# Patient Record
Sex: FEMALE | Race: OTHER | NOT HISPANIC OR LATINO
[De-identification: names, ages, dates, MRNs, and addresses within clinical notes are randomized per-mention and may not be internally consistent; named-entity substitution may affect disease eponyms.]

---

## 2020-12-15 ENCOUNTER — APPOINTMENT (OUTPATIENT)
Dept: OBGYN | Facility: CLINIC | Age: 55
End: 2020-12-15

## 2021-04-22 ENCOUNTER — APPOINTMENT (OUTPATIENT)
Dept: DERMATOLOGY | Facility: CLINIC | Age: 56
End: 2021-04-22

## 2022-06-05 ENCOUNTER — EMERGENCY (EMERGENCY)
Facility: HOSPITAL | Age: 57
LOS: 1 days | Discharge: HOME | End: 2022-06-05
Attending: EMERGENCY MEDICINE | Admitting: EMERGENCY MEDICINE
Payer: SELF-PAY

## 2022-06-05 VITALS
OXYGEN SATURATION: 100 % | HEART RATE: 60 BPM | DIASTOLIC BLOOD PRESSURE: 77 MMHG | TEMPERATURE: 98 F | SYSTOLIC BLOOD PRESSURE: 118 MMHG | RESPIRATION RATE: 16 BRPM

## 2022-06-05 VITALS
OXYGEN SATURATION: 100 % | HEART RATE: 64 BPM | RESPIRATION RATE: 16 BRPM | DIASTOLIC BLOOD PRESSURE: 77 MMHG | SYSTOLIC BLOOD PRESSURE: 132 MMHG | TEMPERATURE: 97 F

## 2022-06-05 PROCEDURE — 99284 EMERGENCY DEPT VISIT MOD MDM: CPT | Mod: 25

## 2022-06-05 PROCEDURE — 71046 X-RAY EXAM CHEST 2 VIEWS: CPT | Mod: 26

## 2022-06-05 PROCEDURE — 93010 ELECTROCARDIOGRAM REPORT: CPT | Mod: NC

## 2022-06-05 RX ORDER — IBUPROFEN 200 MG
600 TABLET ORAL ONCE
Refills: 0 | Status: COMPLETED | OUTPATIENT
Start: 2022-06-05 | End: 2022-06-05

## 2022-06-05 RX ORDER — ACETAMINOPHEN 500 MG
650 TABLET ORAL ONCE
Refills: 0 | Status: COMPLETED | OUTPATIENT
Start: 2022-06-05 | End: 2022-06-05

## 2022-06-05 RX ORDER — IBUPROFEN 200 MG
1 TABLET ORAL
Qty: 12 | Refills: 0
Start: 2022-06-05 | End: 2022-06-08

## 2022-06-05 RX ADMIN — Medication 650 MILLIGRAM(S): at 16:17

## 2022-06-05 RX ADMIN — Medication 600 MILLIGRAM(S): at 16:18

## 2022-06-05 NOTE — ED PROVIDER NOTE - PATIENT PORTAL LINK FT
You can access the FollowMyHealth Patient Portal offered by [facility] by registering at the following website: [email or web address]. By joining [employer]’s FollowMyHealth portal, you will also be able to view your health information using other applications (apps) compatible with our system.

## 2022-06-05 NOTE — ED PROVIDER NOTE - NSFOLLOWUPINSTRUCTIONS_ED_ALL_ED_FT
Follow up with your primary care doctor within 1 week  Take Ibuprofen 600mg every 6-8 hours as needed for pain, take with food  Return to the ER with any worsening or concerning symptoms, increased pain, shortness of breath, weakness, feeling faint or any other concerns.
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
## 2022-06-05 NOTE — ED PROVIDER NOTE - PROGRESS NOTE DETAILS
JEANNE Adam: CXR normal, will d/c home with PMD follow up. Pt denies pain at present, resolved with ibuprofen. She will return to the ER with any worsening or concerning symptoms.

## 2022-06-05 NOTE — ED PROVIDER NOTE - NS ED ATTENDING STATEMENT MOD
I have seen and examined this patient and fully participated in the care of this patient as the teaching attending.  The service was shared with the SAIDA.  I reviewed and verified the documentation and independently performed the documented:
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
## 2022-06-13 PROBLEM — Z00.00 ENCOUNTER FOR PREVENTIVE HEALTH EXAMINATION: Status: ACTIVE | Noted: 2022-06-13

## 2024-05-21 ENCOUNTER — EMERGENCY (EMERGENCY)
Facility: HOSPITAL | Age: 59
LOS: 1 days | Discharge: HOME | End: 2024-05-21
Admitting: STUDENT IN AN ORGANIZED HEALTH CARE EDUCATION/TRAINING PROGRAM
Payer: COMMERCIAL

## 2024-05-21 VITALS
OXYGEN SATURATION: 100 % | SYSTOLIC BLOOD PRESSURE: 146 MMHG | HEART RATE: 59 BPM | DIASTOLIC BLOOD PRESSURE: 84 MMHG | TEMPERATURE: 98 F | RESPIRATION RATE: 18 BRPM

## 2024-05-21 VITALS
TEMPERATURE: 98 F | SYSTOLIC BLOOD PRESSURE: 150 MMHG | HEART RATE: 55 BPM | DIASTOLIC BLOOD PRESSURE: 85 MMHG | OXYGEN SATURATION: 100 % | RESPIRATION RATE: 16 BRPM | WEIGHT: 156.97 LBS

## 2024-05-21 PROCEDURE — 99283 EMERGENCY DEPT VISIT LOW MDM: CPT | Mod: 25

## 2024-05-21 PROCEDURE — 64450 NJX AA&/STRD OTHER PN/BRANCH: CPT | Mod: F8

## 2024-05-21 RX ORDER — LIDOCAINE HCL 20 MG/ML
10 VIAL (ML) INJECTION ONCE
Refills: 0 | Status: COMPLETED | OUTPATIENT
Start: 2024-05-21 | End: 2024-05-21

## 2024-05-21 RX ORDER — TETANUS TOXOID, REDUCED DIPHTHERIA TOXOID AND ACELLULAR PERTUSSIS VACCINE, ADSORBED 5; 2.5; 8; 8; 2.5 [IU]/.5ML; [IU]/.5ML; UG/.5ML; UG/.5ML; UG/.5ML
0.5 SUSPENSION INTRAMUSCULAR ONCE
Refills: 0 | Status: COMPLETED | OUTPATIENT
Start: 2024-05-21 | End: 2024-05-21

## 2024-05-21 RX ORDER — CEPHALEXIN 500 MG
500 CAPSULE ORAL ONCE
Refills: 0 | Status: COMPLETED | OUTPATIENT
Start: 2024-05-21 | End: 2024-05-21

## 2024-05-21 RX ORDER — CEPHALEXIN 500 MG
1 CAPSULE ORAL
Qty: 21 | Refills: 0
Start: 2024-05-21 | End: 2024-05-27

## 2024-05-21 RX ADMIN — Medication 10 MILLILITER(S): at 21:00

## 2024-05-21 RX ADMIN — TETANUS TOXOID, REDUCED DIPHTHERIA TOXOID AND ACELLULAR PERTUSSIS VACCINE, ADSORBED 0.5 MILLILITER(S): 5; 2.5; 8; 8; 2.5 SUSPENSION INTRAMUSCULAR at 21:14

## 2024-05-21 RX ADMIN — Medication 500 MILLIGRAM(S): at 21:14

## 2024-05-21 NOTE — ED ADULT NURSE NOTE - OBJECTIVE STATEMENT
Pt arrives to procedure room A&Ox3 and ambulatory at baseline. Denies any PH. Pt comes to ED c/o ring stuck on R ring finger. Pt states she went to remove the ring today when she was unable to. She states her and her daughter tried removing with lotion and soap and water but the finger only got more swollen. Pt endorsing mild pain to ring finger. R Ring finger noted to be swollen. Respirations even and unlabored on room air. No acute distress noted. Pt medicated per EMAR. Plan of care ongoing, comfort measures provided and safety measures maintained. Pt awaiting disposition.

## 2024-05-21 NOTE — ED ADULT TRIAGE NOTE - CHIEF COMPLAINT QUOTE
Came from urgent care, stating metal ring is stuck on right ring finger, unable to remove it. Right ring finger appears swollen, reddened, painful. Sensation intact. No significant medical history.

## 2024-05-21 NOTE — ED PROVIDER NOTE - CLINICAL SUMMARY MEDICAL DECISION MAKING FREE TEXT BOX
Pt is a 60 YO F with no significant PMH who presented to ED with ring stuck to right ring finger. Ring has been in place x 1 week. Plan for digital block and remov ring with ring cutter

## 2024-05-21 NOTE — ED PROVIDER NOTE - CARE PROVIDERS DIRECT ADDRESSES
[email or web address]

## 2024-05-21 NOTE — ED PROVIDER NOTE - OBJECTIVE STATEMENT
Is a 59-year-old female with no significant past medical history who presented to ED with stuck on finger.  Patient reports she has had ongoing right ring finger for approximately 1 week.  Patient reports today she tried to remove the ring and was unable to.  Patient reports she was seen at urgent care prior to arrival and they were unable to remove the ring.  Patient does not know what material the ring is made out of.  Patient denies numbness or loss of sensation to the finger.

## 2024-05-21 NOTE — ED PROVIDER NOTE - CARE PROVIDER_API CALL
Jose F Elliott whitney  Orthopaedic Surgery  [address] [place] [address], [facility] [address]  [place] [address]  Phone: [phone]  Fax: [phone]  Follow Up Time: 4-6 Days

## 2024-05-21 NOTE — ED PROVIDER NOTE - PATIENT PORTAL LINK FT
You can access the FollowMyHealth Patient Portal offered by [facility] by registering at the following website: [email or web address]. By joining [employer]’s FollowMyHealth portal, you will also be able to view your health information using other applications (apps) compatible with our system.

## 2024-05-21 NOTE — ED PROVIDER NOTE - PROGRESS NOTE DETAILS
JEANNE Martinez: ring successfully removed after digital block with ring cutter   + swelling to finger but neurovascular intact  will give tetanus shot and d/c with keflex, hand surgery follow up

## 2024-05-21 NOTE — ED PROVIDER NOTE - NSFOLLOWUPINSTRUCTIONS_ED_ALL_ED_FT
Take Keflex one tablet by mouth 3 times daily with food  Follow up with Hand surgery  Return to ER if any worsening pain, redness, discharge, swelling, color changes, loss of sensation or any other concerns

## 2024-05-22 PROBLEM — Z78.9 OTHER SPECIFIED HEALTH STATUS: Chronic | Status: ACTIVE | Noted: 2022-06-05

## 2024-12-17 NOTE — ED PROVIDER NOTE - NSICDXPASTMEDICALHX_GEN_ALL_CORE_FT
[facility] Consultation / Initial Visit    Today's Date: 12/17/2024  PCP: Pcp, No  Referring Provider: Sana Vizcaino MD    INDICATION FOR CONSULTATION: NSTEMI  HPI:     Kaela Harris is a 89 year old very pleasant with the current active medical conditions history of coronary artery disease (CAD), hypertension (HTN), gastroesophageal reflux disease (GERD), and hyperlipidemia (HLD). The patient presents to the hospital for evaluation following a fall.    She arrived to the hospital by her daughter calling the EMS when she found her mother in the bathroom, fallen over. She does not remember falling down or how long she was on the ground.     Denies any chest pain, SOB      ROS:     Gen: No fevers or chills,  weight loss,  or weight gain   HEENT: No visual changes, hearing loss, epistaxis, or  sore throat  CV: No palpitations,  CP,  dizziness, or  syncope  Pulm: No SOB, cough, or hemoptysis  GI: No BRBPR , hematemesis,  nausea,  or emesis  : No dysuria or hematuria  Musculoskeletal: No  myalga  Neuro: No seizures, headaches, or weakness  Skin: No rashes or  easy bruising  Psychiatric: No depression or anxiety    Relevant Past Medical History:  Past Medical History:   Diagnosis Date    Coronary artery disease     Essential (primary) hypertension     GERD (gastroesophageal reflux disease)     Hyperlipidemia       Past Surgical History:   Procedure Laterality Date    Cath place for coronary angiography w md sup - interp graft & rt heart      stent        Social History:  Social History     Tobacco Use   Smoking Status Never   Smokeless Tobacco Never     Social History     Substance and Sexual Activity   Alcohol Use Never     History   Drug Use Unknown       Family History:   Family History   Problem Relation Age of Onset    Hypertension Mother     Hypertension Father     Hypertension Sister        Inpatient Medications  Current Facility-Administered Medications   Medication Dose Route Frequency Provider Last Rate  Last Admin    Magnesium Standard Replacement Protocol   Does not apply See Admin Instructions Rebecca Rosado MD        Potassium Standard Replacement Protocol (Levels 3.5 and lower)   Does not apply See Admin Instructions Rebecca Rosado MD        [START ON 12/18/2024] aspirin chewable 81 mg  81 mg Oral Daily Rebecca Rosado MD        metoPROLOL tartrate (LOPRESSOR) tablet 25 mg  25 mg Oral 2 times per day Rebecca Rosado MD        atorvastatin (LIPITOR) tablet 80 mg  80 mg Oral Nightly Rebecca Rosado MD        aspirin chewable 324 mg  324 mg Oral Once Rebecca Rosado MD        pantoprazole (PROTONIX) EC tablet 40 mg  40 mg Oral Daily Sana Vizcaino MD          Current Facility-Administered Medications   Medication Dose Route Frequency Provider Last Rate Last Admin    sodium chloride 0.9% infusion   Intravenous Continuous Rebecca Rosado MD        heparin (porcine) 25,000 units/250 mL in dextrose 5 % infusion  1-30 Units/kg/hr (Order-Specific) Intravenous Continuous Mikala Ordaz MD 6.5 mL/hr at 12/16/24 2349 12 Units/kg/hr at 12/16/24 2349      Current Facility-Administered Medications   Medication Dose Route Frequency Provider Last Rate Last Admin    aluminum-magnesium hydroxide-simethicone (MAALOX) 200-200-20 MG/5ML suspension 30 mL  30 mL Oral Q4H PRN Rebecca Rosado MD        calcium carbonate (TUMS) chewable tablet 1,000 mg  1,000 mg Oral Q4H PRN Rebecca Rosado MD        simethicone (MYLICON) tablet 125 mg  125 mg Oral 4x Daily PRN Rebecca Rosado MD        melatonin tablet 3 mg  3 mg Oral Nightly PRN Rebecca Rosado MD        sodium chloride (NORMAL SALINE) 0.9 % bolus 500 mL  500 mL Intravenous PRN Rebecca Rosado MD        acetaminophen (TYLENOL) tablet 650 mg  650 mg Oral Q4H PRN Rebecca Rosado MD        Or    acetaminophen (TYLENOL) suppository 650 mg  650 mg Rectal Q4H PRN Rebecca Rosado MD        nitroGLYCERIN (NITROSTAT) sublingual tablet 0.4 mg  0.4 mg  Sublingual Q5 Min PRN Rebecca Rosado MD        hydrALAZINE (APRESOLINE) injection 5 mg  5 mg Intravenous Q6H PRN Sana Vizcaino MD        heparin (porcine) injection 3,200 Units  60 Units/kg (Order-Specific) Intravenous PRN Mikala Ordaz MD        heparin (porcine) injection 1,600 Units  30 Units/kg (Order-Specific) Intravenous PRN Mikala Ordaz MD            Allergy   ALLERGIES:   Allergen Reactions    Penicillin G SWELLING        Examination:      Vital Last Value 24 Hour Range   Temperature 96.6 °F (35.9 °C) (12/17/24 0409) Temp  Min: 96.6 °F (35.9 °C)  Max: 97.9 °F (36.6 °C)   Pulse 95 (12/17/24 0409) Pulse  Min: 70  Max: 95   Respiratory 16 (12/17/24 0409) Resp  Min: 16  Max: 30   Non-Invasive  Blood Pressure 109/59 (12/17/24 0409) BP  Min: 87/59  Max: 148/55   Pulse Oximetry 93 % (12/17/24 0409) SpO2  Min: 93 %  Max: 100 %   Arterial   Blood Pressure   No data recorded       Intake/Output Summary (Last 24 hours) at 12/17/2024 0759  Last data filed at 12/17/2024 0600  Gross per 24 hour   Intake 1040.19 ml   Output --   Net 1040.19 ml        Weight    12/16/24 1822 12/17/24 0242   Weight: 54 kg (119 lb 0.8 oz) 50.3 kg (110 lb 14.3 oz)       General - alert and oriented x 3, well nourished, no distress  HENT -normocephalic ,  mucosa  moist   Eyes - pupils equal, round, and reactive to light, normal conjunctiva  Neck - JVD not elevated, carotids normal upstroke with no bruit   Respiratory - good air exchange with no crackles or wheezing   Cardiovascular - regular rate and rhythm, normal S1 and S2, no murmur.    Peripheral Vascular: Right leg edema of lower extremities, warm, pulses palpable   Gastrointestinal  - soft, nontender  Skin - warm, dry, no rash  Neurological  - alert, no focal deficits  Cognition & Speech - speech clear and coherent  Psychiatric - cooperative    Clinical Data:    The following were personally visualized and interpreted by me:    LABS:    CBC  Recent Labs   Lab  24   WBC 6.2 9.9 8.3   HCT 29.4* 33.8* 31.8*   HGB 9.7* 11.3* 10.3*    322 286       CMP  Recent Labs   Lab 24  183   SODIUM 142 139   POTASSIUM 2.8* 3.3*   CHLORIDE 106 102   CO2 24 25   GLUCOSE 101* 116*   BUN 30* 37*   CREATININE 1.13* 1.82*   CALCIUM 8.9 9.2   TOTPROTEIN  --  7.2   ALBUMIN  --  3.2*   BILIRUBIN  --  0.7   AST  --  122*   GPT  --  32   ALKPT  --  93       Cardiac Labs  Recent Labs   Lab 24   CPK  --  826*   NTPROB 7,437*  --        Lipid Panel  No results found    Coags  Recent Labs   Lab 24   INR  --  1.0   * 30       ABG  No results found    IMAGING:    ECG:   Encounter Date: 24   Electrocardiogram 12-Lead   Result Value    Ventricular Rate EKG/Min (BPM) 87    Atrial Rate (BPM) 87    NE-Interval (MSEC) 118    QRS-Interval (MSEC) 82    QT-Interval (MSEC) 360    QTc 433    P Axis (Degrees) 58    R Axis (Degrees) 47    T Axis (Degrees) -32    REPORT TEXT      Sinus rhythm  with sinus arrhythmia  with  premature ventricular complexes  or  fusion complexes  Nonspecific T wave abnormality  Abnormal ECG  When compared with ECG of  16-DEC-2024 18:41,  fusion complexes  are now  present  premature ventricular complexes  are now  present  premature atrial complexes  are no longer  present  Confirmed by CARLOS DEGROOT MD ([id number]) on 2024 7:27:12 AM          Echocardiogram:  Results for orders placed during the hospital encounter of 24    TRANSTHORACIC ECHO (TTE) COMPLETE W/ W/O IMAGING AGENT    Narrative  *[facility]*  [address] [place] [address]  Transthoracic Echocardiogram (TTE)    Patient: Kaela Harris    Study Date/Time:         Mar 22 2024 11:32AM  MRN:     [id number]         FIN#:                    [id number]  :     1933       Ht/Wt:                   152.4cm 49.9kg  Age:     89                BSA/BMI:                 1.46m^2 21.5kg/m^2  Gender:  F                Baseline BP:             132 / 62  Ordering Physician:   Eve Nguyen    Referring Physician:  Eve Nguyen Lolita M    Attending Physician:  Eve Nguyen  Performing Physician: [facility]  Diagnostic Physician: Yvonne Mosquera  Sonographer:          Mikala Ramirez    ------------------------------------------------------------------------------  INDICATIONS:   Congestive heart failure.    ------------------------------------------------------------------------------  STUDY CONCLUSIONS  SUMMARY:    1. Left ventricle: The cavity size is normal. Wall thickness is mildly  increased. There is concentric hypertrophy. Systolic function is normal.  The ejection fraction was measured by visual estimation. Wall motion is  normal; there are no regional wall motion abnormalities. The ejection  fraction is 60%.  2. Left atrium: The atrium is severely dilated.  3. Right ventricle: The cavity size is normal. Wall thickness is normal.  Systolic function is normal. Systolic pressure is within the normal range.  The estimated peak pressure is 26mm Hg.    ------------------------------------------------------------------------------  STUDY DATA:   Procedure:  A transthoracic echocardiogram was performed. Image  quality was adequate.  M-mode, complete 2D, complete spectral Doppler, and  color Doppler.  Study status:  Routine.  Study completion:  There were no  complications.    FINDINGS    LEFT VENTRICLE:  The cavity size is normal. Wall thickness is mildly  increased. There is concentric hypertrophy. Systolic function is normal. Wall  motion is normal; there are no regional wall motion abnormalities.    The  ejection fraction was measured by visual estimation. The ejection fraction is  60%. The tissue Doppler parameters are abnormal. Left ventricular diastolic  function is indeterminate.    AORTIC VALVE:  The annulus is mildly calcified. The valve is trileaflet.  The  leaflets are mildly thickened and mildly calcified. Cusp separation is normal.  Mobility is not restricted. Velocity is within the normal range. There is no  stenosis. There is no significant regurgitation.    AORTA:  Aortic root: The root is normal-sized.    MITRAL VALVE:  The annulus is normal and mildly fibrotic. The leaflets are  normal thickness. Leaflet separation is normal. Mobility is not restricted. No  evidence for prolapse. There is nothing to suggest chordal rupture or a flail  leaflet. Inflow velocity is within the normal range. There is no evidence for  stenosis. There is mild regurgitation. The peak diastolic gradient is 3mm Hg.    LEFT ATRIUM:  The atrium is severely dilated.    RIGHT VENTRICLE:  The cavity size is normal. Wall thickness is normal.  Systolic function is normal.  Systolic pressure is within the normal range.  The estimated peak pressure is 26mm Hg.       The RV pressure during systole  is 26mm Hg.    VENTRICULAR SEPTUM:   Thickness is increased.    PULMONIC VALVE:   Not well visualized. The leaflets are normal thickness.  There is no significant regurgitation.    TRICUSPID VALVE:  The valve is structurally normal. Leaflet separation is  normal. Inflow velocity is within the normal range. There is no evidence for  stenosis. There is mild regurgitation.    PULMONARY ARTERIES:  Systolic pressure is within the normal range.    RIGHT ATRIUM:  The atrium is normal in size.       The estimated central  venous pressure is 3mm Hg.    PERICARDIUM:   There is no pericardial effusion.    SYSTEMIC VEINS:  Inferior vena cava: The IVC is normal-sized.  Respirophasic diameter changes  are in the normal range (>= 50%).    ------------------------------------------------------------------------------  Measurements    Left ventricle         Value        Ref       01/15/2022  Left atrium              Value           Ref       01/15/2022  KILLIAN, LAX chord     (N) 4.0   cm     3.8 - 5.2 4.2         AP  dim, ES           (H) 4.5    cm       2.7 - 3.8 3.5  ESD, LAX chord     (N) 3.2   cm     2.2 - 3.5 3.1         AP dim index, ES     (H) 3.1    cm/m^2   1.5 - 2.3 ----------  KILLIAN/bsa, LAX chord (N) 2.7   cm/m^2 2.3 - 3.1 2.8         Area ES, A4C         (H) 23     cm^2     <=20      26  ESD/bsa, LAX chord (H) 2.2   cm/m^2 1.3 - 2.1 2.0         Area/bsa ES, A4C         15.5   cm^2/m^2 --------- ----------  PW, ED, LAX        (H) 1.4   cm     0.6 - 0.9 1.1         Vol, S               (H) 70     ml       22 - 52   87  IVS, ED            (H) 1.4   cm     0.6 - 0.9 1.2         Vol/bsa, S           (H) 48     ml/m^2   16 - 34   57  PW, ED             (H) 1.4   cm     0.6 - 0.9 1.1         Vol, ES, 1-p A4C     (H) 65     ml       22 - 52   90  IVS/PW, ED             1.05         --------- 1.08        Vol/bsa, ES, 1-p A4C (H) 44     ml/m^2   11 - 40   59  EDV                (N) 68    ml     46 - 106  79          Vol, ES, 1-p A2C     (H) 63     ml       22 - 52   67  ESV                (N) 42    ml     14 - 42   37          Vol/bsa, ES, 1-p A2C (H) 43     ml/m^2   13 - 40   44  EF                 (N) 60    %      54 - 74   63  SV                     26    ml     --------- 42          Mitral valve             Value           Ref       01/15/2022  EDV/bsa            (N) 47    ml/m^2 29 - 61   51          Peak E                   0.87   m/sec    --------- ----------  ESV/bsa            (H) 29    ml/m^2 8 - 24    24          Peak A                   0.91   m/sec    --------- ----------  SV/bsa                 18    ml/m^2 --------- 27          Decel slope              360.77 cm/s^2   --------- ----------  ESV, 1-p A4C       (N) 13    ml     12 - 60   17          Decel time               192    ms       --------- ----------  SV, 1-p A4C            45    ml     --------- 26          Peak grad, D             3      mm Hg    --------- ----------  ESV/bsa, 1-p A4C   (N) 9     ml/m^2 7 - 35    11          Peak E/A ratio           0.95             --------- ----------  SV/bsa, 1-p A4C        31    ml/m^2 --------- 17          MR peak v                4.15   m/sec    --------- ----------  EDV, 2-p           (N) 47    ml     46 - 106  38          Peak LV-LA grad S        69     mm Hg    --------- ----------  ESV, 2-p           (L) 10    ml     14 - 42   14  EDV/bsa, 2-p       (N) 32    ml/m^2 29 - 61   25          Tricuspid valve          Value           Ref       01/15/2022  ESV/bsa, 2-p       (L) 7     ml/m^2 8 - 24    9           TR peak v            (N) 2.4    m/sec    <=2.8     ----------  Peak RV-RA grad, S       23     mm Hg    --------- ----------  LVOT                   Value        Ref       01/15/2022  Peak emmy, S            1.04  m/sec  --------- ----------  Aortic root              Value           Ref       01/15/2022  Root diam, ED        (L) 2.2    cm       2.3 - 3.7 ----------  Right ventricle        Value        Ref       01/15/2022  KILLIAN, LAX               3.1   cm     --------- 2.6         Pulmonary artery         Value           Ref       01/15/2022  TAPSE, MM          (N) 1.8   cm     >=1.7     ----------  Pressure, S              26     mm Hg    --------- ----------  Pressure, S            26    mm Hg  --------- ----------  Systemic veins           Value           Ref       01/15/2022  Estimated CVP            3      mm Hg    --------- ----------  Legend:  (L)  and  (H)  daniel values outside specified reference range.    (N)  marks values inside specified reference range.    Prepared and electronically signed by  Yvonne Mosquera  03/22/2024 15:19       Cath Report:  No results found for this or any previous visit.  Assessment/Plans:   Hospital Day: 2 of admission. 89 year old female w/ PMHx as below p/w NSTEMI     EKGs personally reviewed and showed Sinus rhythm with sinus arrhythmia with premature ventricular complexes or fusion complexes, Nonspecific T wave abnormality   Echo personally reviewed and showed pending  Trops/BNP/Lipid  personally reviewed and showed: Trop 15,294 --> 9,973. BNP 7,437.      Assessment/Plan:    Abnormal cardiac enzymes: Trending down  Troponin 15,294 --> 9,973. BNP 7,437.   EKG without significant ST changes  Echo 3/2024: concentric hypertrophy. Systolic function is normal.  Repeat echo pending  Aspirin and statin  Heparin drip  Pending lipid profile and A1c  In the absence of chest pain and advanced age we will consider medical therapy only  Also patient not interested in invasive procedure, she also noncompliant with medication    Acute unprovoked RLE DVT  Troponin 15,294 --> 9,973. BNP 7,437.   US LE: deep venous thrombosis at the right mid to distal superficial femoral vein.  Continue anticoagulation. Transition to PO AC on discharge  Price check for Eliquis  Compression stockings for 2 years  CTA with no PE     Hypertension  Controlled  BP meds on hold    ADAMA  Cr 1.13  Pt. recieved 1L 0.9NS IV fluids in ED   Nephrology consulted    Hyperlipidemia  Continue statin    Encephalopathy vs undiagnosed cardiomyopathy:  - refusing medications and has a sitter      Dariel Uriostegui MD, FACC, FSCAI, RPVI  [facility] Cardiologist  Office [phone]    12/17/2024  The above note was created using dictation using voice recognition software.  While every effort was made to correct the dictation, an occasional error may have been missed.    PAST MEDICAL HISTORY:  No pertinent past medical history